# Patient Record
Sex: MALE | Race: WHITE
[De-identification: names, ages, dates, MRNs, and addresses within clinical notes are randomized per-mention and may not be internally consistent; named-entity substitution may affect disease eponyms.]

---

## 2018-04-09 ENCOUNTER — HOSPITAL ENCOUNTER (OUTPATIENT)
Dept: HOSPITAL 47 - RADUSWWP | Age: 28
Discharge: HOME | End: 2018-04-09
Payer: SELF-PAY

## 2018-04-09 DIAGNOSIS — R10.11: ICD-10-CM

## 2018-04-09 DIAGNOSIS — R93.2: Primary | ICD-10-CM

## 2018-04-09 PROCEDURE — 76700 US EXAM ABDOM COMPLETE: CPT

## 2018-04-09 NOTE — US
EXAMINATION TYPE: US abdomen complete

 

DATE OF EXAM: 4/9/2018

 

COMPARISON: 10/3/2011

 

CLINICAL HISTORY: R10.11 Right upper quadrant pain. rt sided pain, doctor questioning fatty liver

 

EXAM MEASUREMENTS:

 

Liver Length:  17.4 cm   

Gallbladder Wall:  0.2 cm   

CBD:  0.6 cm

Spleen:  14.8 cm   

Right Kidney:  10.9 x 5.0 x 5.4 cm 

Left Kidney:  12.7 x 4.4 x 5.1 cm   

 

 

 

Pancreas:  wnl

Liver:  focal fatty sparring   

Gallbladder:  wnl

**Evidence for sonographic House's sign:  no

CBD:  wnl 

Spleen:  enlarged   

Right Kidney:  wnl   

Left Kidney:  wnl   

Upper IVC:  wnl  

Abd Aorta:  wnl

 

 

 

The liver is of increased echogenicity with areas of focal fatty sparing. The intrahepatic portion of
 the IVC and proximal abdominal aorta are within normal limits.  There is no evidence of cholelithias
is.  Common bile duct is unremarkable.  The visualized portions of the pancreas are homogenous.  The 
spleen is unremarkable.  Kidneys are symmetric and free of hydronephrosis.  No renal lesions are seen
.

 

IMPRESSION: 

1.The liver is of increased echogenicity consistent with hepatic steatosis with areas of focal fatty 
sparing.

## 2018-10-05 ENCOUNTER — HOSPITAL ENCOUNTER (OUTPATIENT)
Dept: HOSPITAL 47 - RADUSWWP | Age: 28
Discharge: HOME | End: 2018-10-05
Attending: FAMILY MEDICINE
Payer: COMMERCIAL

## 2018-10-05 DIAGNOSIS — R20.0: Primary | ICD-10-CM

## 2018-10-05 PROCEDURE — 93970 EXTREMITY STUDY: CPT

## 2018-10-05 NOTE — US
EXAMINATION TYPE: US venous doppler duplex UE BI

 

DATE OF EXAM: 10/5/2018

 

COMPARISON: NONE

 

CLINICAL HISTORY: R20.0 Anesthesia of skin.

 

SIDE PERFORMED:

 

Normal compressibility and spontaneous flow noted bilaterally.

 

Right Arm: Negative for DVT

 

Left Arm: Negative for DVT

 

 

 

IMPRESSION: 

1. No diagnostic evidence of DVT as visualized.

## 2019-07-21 ENCOUNTER — HOSPITAL ENCOUNTER (EMERGENCY)
Dept: HOSPITAL 47 - EC | Age: 29
Discharge: HOME | End: 2019-07-21
Payer: COMMERCIAL

## 2019-07-21 VITALS
RESPIRATION RATE: 16 BRPM | SYSTOLIC BLOOD PRESSURE: 132 MMHG | TEMPERATURE: 97.8 F | DIASTOLIC BLOOD PRESSURE: 78 MMHG | HEART RATE: 79 BPM

## 2019-07-21 DIAGNOSIS — Z91.040: ICD-10-CM

## 2019-07-21 DIAGNOSIS — B34.9: ICD-10-CM

## 2019-07-21 DIAGNOSIS — J30.2: Primary | ICD-10-CM

## 2019-07-21 DIAGNOSIS — Z88.0: ICD-10-CM

## 2019-07-21 PROCEDURE — 94640 AIRWAY INHALATION TREATMENT: CPT

## 2019-07-21 PROCEDURE — 99285 EMERGENCY DEPT VISIT HI MDM: CPT

## 2019-07-21 PROCEDURE — 71046 X-RAY EXAM CHEST 2 VIEWS: CPT

## 2019-07-21 NOTE — XR
EXAMINATION TYPE: XR chest 2V

 

DATE OF EXAM: 7/21/2019

 

HISTORY: Cough/pain.

 

REFERENCE: Previous study dated 3/21/2018.

 

FINDINGS: The lungs are clear. Pleural spaces are clear. The heart is not enlarged.

 

IMPRESSION: 

 

NORMAL CHEST.

## 2019-07-21 NOTE — ED
URI HPI





- General


Chief Complaint: Upper Respiratory Infection


Stated Complaint: KANIKA,asthma


Time Seen by Provider: 07/21/19 11:25


Source: patient


Mode of arrival: ambulatory


Limitations: no limitations





- History of Present Illness


Initial Comments: 





Patient is a 29-year-old male presenting to emergency Department with complaints

of chest congestion, cough 3 days.  Patient states he went to urgent care 

yesterday and received a breathing treatment which did improve his symptoms but 

he woke up today feeling worse again.  Urgent care also told him that this is 

most likely ALLERGY related and to try Claritin.  Patient states he has not 

tried that yet.  He has not taken anything else over-the-counter.  Patient 

denies any fever, chills, shortness of breath.  Patient has history of asthma.  

Patient states he is coughing up some phlegm.  No other complaints at this time.





- Related Data


                                Home Medications











 Medication  Instructions  Recorded  Confirmed


 


Albuterol Inhaler [Ventolin Hfa 1 - 2 puff INHALATION RT-Q6H PRN 03/21/18 03/21/18





Inhaler]   


 


Ibuprofen [Motrin Ib] 600 mg PO ONCE PRN 03/21/18 03/21/18


 


Phenylephrine/Dm/Acetaminop/GG 30 ml PO Q4H PRN 03/21/18 03/21/18





[Vicks Dayquil Severe Cold-Flu]   








                                  Previous Rx's











 Medication  Instructions  Recorded


 


Albuterol Nebulized [Ventolin 2.5 mg INHALATION Q4H PRN #25 nebu 07/21/19





Nebulized]  


 


Loratadine [Claritin] 10 mg PO DAILY 20 Days #20 tab 07/21/19


 


Phenylephrine/Dm/Acetaminop/GG 30 ml PO Q4HR PRN 5 Days #1 bottle 07/21/19





[Vicks Dayquil Severe Cold-Flu]  











                                    Allergies











Allergy/AdvReac Type Severity Reaction Status Date / Time


 


latex Allergy  Unknown Verified 03/21/18 21:25


 


Penicillins Allergy  Unknown Verified 03/21/18 21:25














Review of Systems


ROS Statement: 


Those systems with pertinent positive or pertinent negative responses have been 

documented in the HPI.





ROS Other: All systems not noted in ROS Statement are negative.





Past Medical History


Past Medical History: No Reported History


History of Any Multi-Drug Resistant Organisms: None Reported


Past Surgical History: No Surgical Hx Reported


Additional Past Surgical History / Comment(s): tumor removal right leg


Past Psychological History: No Psychological Hx Reported


Smoking Status: Never smoker


Past Alcohol Use History: Rare


Past Drug Use History: None Reported





General Exam





- General Exam Comments


Initial Comments: 





GENERAL: Well-appearing, well-nourished and in no acute distress.


HEAD: Atraumatic, normocephalic.


EYES: Pupils equal round and reactive to light, extraocular movements intact, 

sclera anicteric, conjunctiva are normal.


ENT: TMs normal, nares patent, oropharynx clear without exudates.  Moist mucous 

membranes.


NECK: Normal range of motion, supple without lymphadenopathy or JVD.


LUNGS: Breath sounds clear to auscultation bilaterally and equal.  No wheezes 

rales or rhonchi.


HEART: Regular rate and rhythm without murmurs, rubs or gallops.


ABDOMEN: Soft, nontender, normoactive bowel sounds.  No guarding, no rebound.  

No masses appreciated.


: Deferred 


EXTREMITIES: Normal range of motion, no pitting or edema.  No clubbing or cyano

sis.


NEUROLOGICAL: Cranial nerves II through XII grossly intact.  Normal speech, 

normal gait.


PSYCH: Normal mood, normal affect.


SKIN: Warm, Dry, normal turgor, no rashes or lesions noted.


Limitations: no limitations





Course


                                   Vital Signs











  07/21/19 07/21/19 07/21/19





  11:10 12:24 12:35


 


Temperature 98 F  


 


Pulse Rate 92 76 78


 


Respiratory 18  





Rate   


 


Blood Pressure 118/78  


 


O2 Sat by Pulse 98  





Oximetry   














  07/21/19





  13:07


 


Temperature 97.8 F


 


Pulse Rate 79


 


Respiratory 16





Rate 


 


Blood Pressure 132/78


 


O2 Sat by Pulse 97





Oximetry 














Medical Decision Making





- Medical Decision Making





Patient was a 29-year-old male with complaints of chest congestion and cough 3 

days.  Patient states he went to urgent care yesterday and states they gave him 

a breathing treatment and stated this is likely related to ALLERGIES and/or 

viral nature.  Patient states he returns today because he still having chest 

congestion and he believes he is wheezing.  Patient does have a history of 

asthma.  Patient's exam today is completely unremarkable.  There is no wheezing 

lungs.  Patient was given and another breathing treatment which he reports 

improvement.  Chest x-ray was normal.  Patient counseled this is likely viral 

ALLERGY related.  Patient is given prescription for Claritin and his 

decongestant.  Return parameters were discussed with patient he verbalized 

understanding.  Patient will be discharged home.  Case discussed with Dr. Khan.





Disposition


Clinical Impression: 


 Viral infection, Seasonal allergies





Disposition: HOME SELF-CARE


Condition: Stable


Instructions (If sedation given, give patient instructions):  Allergic Rhinitis 

(ED), Acute Cough (ED)


Additional Instructions: 


Please return to the Emergency Department if symptoms worsen or any other 

concerns.


Follow up with PCP as symptoms continue.


Prescriptions: 


Loratadine [Claritin] 10 mg PO DAILY 20 Days #20 tab


Albuterol Nebulized [Ventolin Nebulized] 2.5 mg INHALATION Q4H PRN #25 nebu


 PRN Reason: difficulty in breathing


Phenylephrine/Dm/Acetaminop/GG [Vicks Dayquil Severe Cold-Flu] 30 ml PO Q4HR PRN

5 Days #1 bottle


 PRN Reason: Congestion


Is patient prescribed a controlled substance at d/c from ED?: No


Referrals: 


Cat Champion MD [Primary Care Provider] - 1-2 days

## 2021-03-09 ENCOUNTER — HOSPITAL ENCOUNTER (OUTPATIENT)
Dept: HOSPITAL 47 - RADUSWWP | Age: 31
End: 2021-03-09
Attending: FAMILY MEDICINE
Payer: COMMERCIAL

## 2021-03-09 DIAGNOSIS — R16.1: Primary | ICD-10-CM

## 2021-03-09 DIAGNOSIS — R93.2: ICD-10-CM

## 2021-03-09 PROCEDURE — 76700 US EXAM ABDOM COMPLETE: CPT

## 2021-03-09 NOTE — US
EXAMINATION TYPE: US abdomen complete

 

DATE OF EXAM: 3/9/2021

 

COMPARISON: CT October 7, 2011. Ultrasound abdomen April 9, 2018 

 

CLINICAL HISTORY: R74.8 Elevated Liver [id number] elevated bilirubin.

 

EXAM MEASUREMENTS:

 

Liver Length:  18.3 cm   

Gallbladder Wall:  0.2 cm   

CBD:  0.3 cm

Spleen:  13.8 cm   

Right Kidney:  12.0 x 4.1 x 6.10 cm 

Left Kidney: cm   

 

 

 

Pancreas:  Tail obscured by overlying bowel gas

Liver:   enlarged, focal fatty sparing adjacent to gallbladder, fatty infiltrate

Gallbladder: wnl

 **Evidence for sonographic House's sign: no

CBD:  wnl 

Spleen:  enlarged   

Right Kidney:  Inferior pole obscured by bowel gas, no hydronephrosis or masses seen  

Left Kidney:  No hydronephrosis or masses seen   

Upper IVC:  wnl  

Abd Aorta: bifurcation obscured by overlying bowel gas

 

 

 

The visualized liver is redemonstrated heterogeneously hyperechoic.  The intrahepatic portion of the 
IVC and visualized abdominal aorta are within normal limits.  There is no evidence of shadowing mobil
e cholelithiasis.  Common bile duct is unremarkable.  The visualized portions of the pancreas are lori
ogenous.  The spleen remains enlarged.  Kidneys are symmetric and free of hydronephrosis.  No renal l
esions are seen on images saved.

 

IMPRESSION: Splenomegaly redemonstrated. Heterogeneous hyperechoic appearance of liver redemonstrated
 And is thought to be on basis of diffuse fatty infiltration.  No significant change from prior studi
es.